# Patient Record
Sex: FEMALE | Race: WHITE | Employment: FULL TIME | URBAN - METROPOLITAN AREA
[De-identification: names, ages, dates, MRNs, and addresses within clinical notes are randomized per-mention and may not be internally consistent; named-entity substitution may affect disease eponyms.]

---

## 2020-03-02 ENCOUNTER — OFFICE VISIT (OUTPATIENT)
Dept: URGENT CARE | Facility: RETAIL CLINIC | Age: 45
End: 2020-03-02
Payer: COMMERCIAL

## 2020-03-02 VITALS
TEMPERATURE: 97.4 F | OXYGEN SATURATION: 100 % | HEART RATE: 70 BPM | SYSTOLIC BLOOD PRESSURE: 148 MMHG | DIASTOLIC BLOOD PRESSURE: 94 MMHG

## 2020-03-02 DIAGNOSIS — J02.9 ACUTE PHARYNGITIS, UNSPECIFIED ETIOLOGY: Primary | ICD-10-CM

## 2020-03-02 LAB — S PYO AG THROAT QL IA.RAPID: NEGATIVE

## 2020-03-02 PROCEDURE — 99203 OFFICE O/P NEW LOW 30 MIN: CPT | Performed by: PHYSICIAN ASSISTANT

## 2020-03-02 PROCEDURE — 87880 STREP A ASSAY W/OPTIC: CPT | Mod: QW | Performed by: PHYSICIAN ASSISTANT

## 2020-03-02 PROCEDURE — 87081 CULTURE SCREEN ONLY: CPT | Performed by: PHYSICIAN ASSISTANT

## 2020-03-02 RX ORDER — HYDROCODONE BITARTRATE AND ACETAMINOPHEN 5; 325 MG/1; MG/1
TABLET ORAL
COMMUNITY
Start: 2019-08-29 | End: 2020-03-02

## 2020-03-02 RX ORDER — HYDROXYZINE HYDROCHLORIDE 25 MG/1
TABLET, FILM COATED ORAL
COMMUNITY
Start: 2019-08-29 | End: 2020-03-02

## 2020-03-02 RX ORDER — ALBUTEROL SULFATE 90 UG/1
AEROSOL, METERED RESPIRATORY (INHALATION)
COMMUNITY
Start: 2019-04-23

## 2020-03-02 RX ORDER — CETIRIZINE HCL/PSEUDOEPHEDRINE 5 MG-120MG
TABLET, EXTENDED RELEASE 12 HR ORAL SEE ADMIN INSTRUCTIONS
COMMUNITY
Start: 2019-04-22

## 2020-03-02 RX ORDER — MONTELUKAST SODIUM 10 MG/1
10 TABLET ORAL
COMMUNITY
Start: 2019-11-22

## 2020-03-02 NOTE — PROGRESS NOTES
Chief Complaint   Patient presents with     Pharyngitis     s/t x 2 days along with neck pain posterior       SUBJECTIVE:  Kelly Brewer is a 44 year old female with a chief complaint of sore throat.  Onset of symptoms was 1 day(s) ago.    Course of illness: gradual onset and still present.  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  Current Outpatient Medications   Medication Sig Dispense Refill     montelukast (SINGULAIR) 10 MG tablet Take 10 mg by mouth       acetaminophen (TYLENOL) 500 MG tablet Take 1-2 tablets by mouth every 6 hours as needed.       aspirin (ASA) 325 MG EC tablet        Ibuprofen 100 MG TABS Take  by mouth.       VENTOLIN  (90 Base) MCG/ACT inhaler        ZYRTEC-D ALLERGY & CONGESTION 5-120 MG 12 hr tablet See Admin Instructions       History   Smoking Status     Former Smoker     Types: Cigarettes     Quit date: 8/16/2011   Smokeless Tobacco     Never Used       ROS:  CONSTITUTIONAL:POSITIVE mild body aches NEGATIVE for fever, chills  ENT/MOUTH: POSITIVE for sore throat and NEGATIVE for ear pain   RESP:NEGATIVE for significant cough or wheezing    OBJECTIVE:   BP (!) 148/94   Pulse 70   Temp 97.4  F (36.3  C) (Temporal)   SpO2 100%   GENERAL APPEARANCE: healthy, alert and no distress  EYES: conjunctiva clear  HENT: ear canals and TM's normal.  Nose normal.  Pharynx erythematous with no exudate noted.  NECK: supple, non-tender to palpation, no adenopathy noted  RESP: lungs clear to auscultation - no rales, rhonchi or wheezes  CV: regular rates and rhythm, normal S1 S2, no murmur noted    Rapid Strep test is negative; await throat culture results.    ASSESSMENT:  Acute pharyngitis, unspecified etiology    PLAN:   Rapid strep test today is  "negative.   Throat culture is pending. [facility] will call if positive results to start antibiotics at that time; No call if the culture is negative.  Symptomatic measures: plenty of fluids (mainly water) and rest.   Take Tylenol/Acetaminophen, Aleve or Motrin/Ibuprofen as needed for pain or fever  May drink tea mixed with a few tablespoons of honey to soothe throat.  \"Throat Coat\" tea is soothing as well.  Gargling with warm salt water can help reduce throat pain. Dissolve 1/2 teaspoon of salt into 1 glass of warm water.    Sucrets or Cepacol spray are over the counter medications that can temporarily numb your throat.  Please follow up with primary care provider if not improving, worsening or new symptoms    Mariana Morelos PA-C  [facility]   "

## 2020-03-02 NOTE — PATIENT INSTRUCTIONS
"Rapid strep test today is negative.   Throat culture is pending. [facility] will call if positive results to start antibiotics at that time; No call if the culture is negative.  Symptomatic measures: plenty of fluids (mainly water) and rest.   Take Tylenol/Acetaminophen, Aleve or Motrin/Ibuprofen as needed for pain or fever  May drink tea mixed with a few tablespoons of honey to soothe throat.  \"Throat Coat\" tea is soothing as well.  Gargling with warm salt water can help reduce throat pain. Dissolve 1/2 teaspoon of salt into 1 glass of warm water.    Sucrets or Cepacol spray are over the counter medications that can temporarily numb your throat.  Please follow up with primary care provider if not improving, worsening or new symptoms    "

## 2020-03-04 LAB
BACTERIA SPEC CULT: NORMAL
SPECIMEN SOURCE: NORMAL